# Patient Record
Sex: MALE | Race: BLACK OR AFRICAN AMERICAN | URBAN - METROPOLITAN AREA
[De-identification: names, ages, dates, MRNs, and addresses within clinical notes are randomized per-mention and may not be internally consistent; named-entity substitution may affect disease eponyms.]

---

## 2023-06-09 ENCOUNTER — OFFICE VISIT (OUTPATIENT)
Dept: INTERNAL MEDICINE CLINIC | Facility: CLINIC | Age: 43
End: 2023-06-09

## 2023-06-09 VITALS
DIASTOLIC BLOOD PRESSURE: 89 MMHG | SYSTOLIC BLOOD PRESSURE: 133 MMHG | WEIGHT: 205.81 LBS | BODY MASS INDEX: 27.87 KG/M2 | HEIGHT: 72 IN | HEART RATE: 58 BPM

## 2023-06-09 DIAGNOSIS — J02.9 SORE THROAT: Primary | ICD-10-CM

## 2023-06-09 DIAGNOSIS — L21.9 SEBORRHEIC DERMATITIS OF SCALP: ICD-10-CM

## 2023-06-09 LAB
CONTROL LINE PRESENT WITH A CLEAR BACKGROUND (YES/NO): YES YES/NO
KIT LOT #: NORMAL NUMERIC
STREP GRP A CUL-SCR: NEGATIVE

## 2023-06-09 PROCEDURE — 99203 OFFICE O/P NEW LOW 30 MIN: CPT | Performed by: INTERNAL MEDICINE

## 2023-06-09 PROCEDURE — 87880 STREP A ASSAY W/OPTIC: CPT | Performed by: INTERNAL MEDICINE

## 2023-06-09 PROCEDURE — 3075F SYST BP GE 130 - 139MM HG: CPT | Performed by: INTERNAL MEDICINE

## 2023-06-09 PROCEDURE — 3008F BODY MASS INDEX DOCD: CPT | Performed by: INTERNAL MEDICINE

## 2023-06-09 PROCEDURE — 3079F DIAST BP 80-89 MM HG: CPT | Performed by: INTERNAL MEDICINE

## 2023-06-09 NOTE — PATIENT INSTRUCTIONS
Please let me know if your scratchy throat persists. I would then refer you to ENT. Please schedule an appointment with Dermatology.

## 2023-06-16 ENCOUNTER — OFFICE VISIT (OUTPATIENT)
Dept: DERMATOLOGY CLINIC | Facility: CLINIC | Age: 43
End: 2023-06-16

## 2023-06-16 DIAGNOSIS — L21.9 SEBORRHEIC DERMATITIS: Primary | ICD-10-CM

## 2023-06-16 PROCEDURE — 99203 OFFICE O/P NEW LOW 30 MIN: CPT | Performed by: PHYSICIAN ASSISTANT

## 2023-06-16 RX ORDER — KETOCONAZOLE 20 MG/ML
SHAMPOO TOPICAL
Qty: 120 ML | Refills: 3 | Status: SHIPPED | OUTPATIENT
Start: 2023-06-16

## 2023-06-16 RX ORDER — FLUOCINOLONE ACETONIDE 0.11 MG/ML
1 OIL TOPICAL
Qty: 118.28 ML | Refills: 3 | Status: SHIPPED | OUTPATIENT
Start: 2023-06-19

## 2023-06-16 RX ORDER — KETOCONAZOLE 20 MG/G
CREAM TOPICAL
Qty: 30 G | Refills: 1 | Status: SHIPPED | OUTPATIENT
Start: 2023-06-16

## 2023-07-20 ENCOUNTER — OFFICE VISIT (OUTPATIENT)
Dept: INTERNAL MEDICINE CLINIC | Facility: CLINIC | Age: 43
End: 2023-07-20

## 2023-07-20 VITALS
BODY MASS INDEX: 27.9 KG/M2 | WEIGHT: 206 LBS | SYSTOLIC BLOOD PRESSURE: 134 MMHG | DIASTOLIC BLOOD PRESSURE: 86 MMHG | HEIGHT: 72 IN | OXYGEN SATURATION: 99 % | HEART RATE: 64 BPM

## 2023-07-20 DIAGNOSIS — M25.562 LEFT KNEE PAIN, UNSPECIFIED CHRONICITY: ICD-10-CM

## 2023-07-20 DIAGNOSIS — Z00.00 PE (PHYSICAL EXAM), ROUTINE: Primary | ICD-10-CM

## 2023-07-20 DIAGNOSIS — R13.19 OTHER DYSPHAGIA: ICD-10-CM

## 2023-07-20 DIAGNOSIS — R09.82 POST-NASAL DRAINAGE: ICD-10-CM

## 2023-07-20 DIAGNOSIS — D22.9 NUMEROUS SKIN MOLES: ICD-10-CM

## 2023-07-20 DIAGNOSIS — K21.9 GASTROESOPHAGEAL REFLUX DISEASE WITHOUT ESOPHAGITIS: ICD-10-CM

## 2023-07-20 PROCEDURE — 99396 PREV VISIT EST AGE 40-64: CPT | Performed by: INTERNAL MEDICINE

## 2023-07-20 PROCEDURE — 99213 OFFICE O/P EST LOW 20 MIN: CPT | Performed by: INTERNAL MEDICINE

## 2023-07-20 PROCEDURE — 3008F BODY MASS INDEX DOCD: CPT | Performed by: INTERNAL MEDICINE

## 2023-07-20 PROCEDURE — 3079F DIAST BP 80-89 MM HG: CPT | Performed by: INTERNAL MEDICINE

## 2023-07-20 PROCEDURE — 3075F SYST BP GE 130 - 139MM HG: CPT | Performed by: INTERNAL MEDICINE

## 2023-07-20 RX ORDER — PANTOPRAZOLE SODIUM 40 MG/1
40 TABLET, DELAYED RELEASE ORAL
Qty: 90 TABLET | Refills: 0 | Status: SHIPPED | OUTPATIENT
Start: 2023-07-20

## 2023-07-20 NOTE — PROGRESS NOTES
Subjective:   Patient ID: Salas Alvarez is a 37year old male. Patient presents with:  Physical  Knee Pain  Back Pain: C/o  on/off left sided back pain 1-2 weeks. Recalls no injuries. Stts that he can feel it in his chest as well. Patient presents today for physical exam, states doing well otherwise, has some left knee pain only with banding , occasional few seconds left back pain on off , he exercise and lift weights without any cp sob, mcgowan ,palpitations   also denies nausea, vomiting, diarrhea, constipation or abdominal pain. No fever, chills, or UTI symptoms. The rest of the review of systems, see below. Knee Pain   This is a recurrent problem. The current episode started more than 1 month ago. There has been no history of extremity trauma. The problem has been waxing and waning. The pain is mild. Associated symptoms include stiffness. Pertinent negatives include no fever or limited range of motion. Back Pain  This is a new problem. The current episode started in the past 7 days. The problem occurs rarely. The problem has been waxing and waning since onset. The pain is present in the thoracic spine. Pertinent negatives include no abdominal pain, bladder incontinence, bowel incontinence, chest pain, dysuria, fever, headaches or leg pain. He has tried nothing for the symptoms. The treatment provided significant relief. History/Other:   Review of Systems   Constitutional:  Negative for chills, fatigue and fever. HENT:  Negative for ear pain and sore throat. Eyes:  Negative for pain and redness. Respiratory:  Negative for cough, shortness of breath and wheezing. Cardiovascular:  Negative for chest pain, palpitations and leg swelling. Gastrointestinal:  Negative for abdominal pain, bowel incontinence, constipation, diarrhea, nausea and vomiting. Heartburn + swallowing problem for 10 years  for solid food    Genitourinary:  Negative for bladder incontinence, dysuria and frequency. Musculoskeletal:  Positive for arthralgias (left - few month -  only  with bending ,   not  walking), back pain (mid back pain 1-2  weeks , knee pain  - on his knees  instaling  tire) and stiffness. Skin:  Negative for pallor. Neurological:  Negative for dizziness and headaches. Psychiatric/Behavioral:  Negative for confusion. The patient is not nervous/anxious. Current Outpatient Medications   Medication Sig Dispense Refill    Fluocinolone Acetonide Scalp (DERMA-SMOOTHE/FS SCALP) 0.01 % External Oil Apply 1 Application topically twice a week. 118.28 mL 3    ketoconazole 2 % External Cream Apply to affected area 2 times daily. 30 g 1    ketoconazole 2 % External Shampoo Apply to scalp 2 times per week. 120 mL 3     Allergies:No Known Allergies    Objective:   Physical Exam  Vitals and nursing note reviewed. Constitutional:       General: He is not in acute distress. HENT:      Head: Normocephalic and atraumatic. Right Ear: Tympanic membrane, ear canal and external ear normal.      Left Ear: Tympanic membrane, ear canal and external ear normal.      Nose: Nose normal.      Mouth/Throat:      Pharynx: Uvula midline. No oropharyngeal exudate or posterior oropharyngeal erythema. Eyes:      General: No scleral icterus. Right eye: No discharge. Left eye: No discharge. Cardiovascular:      Rate and Rhythm: Normal rate and regular rhythm. Heart sounds: Normal heart sounds. No murmur heard. Pulmonary:      Effort: Pulmonary effort is normal. No respiratory distress. Breath sounds: Normal breath sounds. No wheezing or rales. Abdominal:      Palpations: Abdomen is soft. There is no mass. Tenderness: There is no abdominal tenderness. There is no right CVA tenderness or left CVA tenderness. Comments: No hepatomegaly, no splenomegaly   Musculoskeletal:      Cervical back: Neck supple. Right lower leg: No edema. Left lower leg: No edema.    Lymphadenopathy: Cervical: No cervical adenopathy. Skin:     General: Skin is warm and dry. Comments: Skin moles numerous   Neurological:      Mental Status: He is alert and oriented to person, place, and time. Psychiatric:         Behavior: Behavior normal.     Blood pressure 134/86, pulse 64, height 6' (1.829 m), weight 206 lb (93.4 kg), SpO2 99 %. Assessment & Plan:   No diagnosis found. Physical exam  Maintain a healthy diet , low saturated fat and low sugar diet  Keep good hydration  Maintain a regular activity /walking as tolerated   Complete labs as ordered,   Denies covid and prostate  fhx cancer   Preventative health maintenance tests reviewed   Immunizations reviewed - [facility]  covid vaccine x2 2021 ,   tdap -  2021   Patient verbalized understanding and compliance    - CBC WITH DIFFERENTIAL WITH PLATELET; Future  - COMP METABOLIC PANEL (14); Future  - LIPID PANEL; Future  - URINE CULTURE, ROUTINE; Future  - TSH W REFLEX TO FREE T4; Future    2. Numerous skin moles  - refer to DERM - INTERNAL    3. Left knee pain, unspecified chronicity  Avoid kneeling   Refer to PT if perisitnt pain  - XR KNEE (3 VIEWS), LEFT (CPT=73562); Future    4. Gastroesophageal reflux disease without esophagitis  Patient advised to avoid spicy food, coffee, tea, caffeinated drinks, alcohol, acidic food/juices  Advised to avoid NSAID's - Aspirin-based medications  Advised to avoid wearing  tight clothes   Advised to elevate the head of the bed   Avoid eating at least 3 hours before bedtime   Counseling on ideal weight/BMI  Take PPIs qd in the morning 30-60 minutes before breakfast always on empty stomach Side effects and directions of medication discussed with patient. Patient verbalized understanding and compliance. 5. Post-nasal drainage  Allegra 1800 mg every day 2-3 weeks than as needed     6.  Other dysphagia  Soft food   Liquidly food  Refer to Gastro   PPI every day  to start   - GASTRO - INTERNAL     No orders of the defined types were placed in this encounter.       Meds This Visit:  Requested Prescriptions      No prescriptions requested or ordered in this encounter       Imaging & Referrals:  None

## 2023-07-21 RX ORDER — FEXOFENADINE HCL 180 MG/1
180 TABLET ORAL DAILY
Qty: 30 TABLET | Refills: 1 | Status: SHIPPED | OUTPATIENT
Start: 2023-07-21

## 2023-07-26 RX ORDER — FLUOCINOLONE ACETONIDE 0.11 MG/ML
1 OIL TOPICAL
Qty: 118.28 ML | Refills: 3 | OUTPATIENT
Start: 2023-07-27

## 2023-07-26 NOTE — TELEPHONE ENCOUNTER
Current refill request refused due to refill is either a duplicate request or has active refills at the pharmacy. Check previous templates. Requested Prescriptions     Refused Prescriptions Disp Refills    Fluocinolone Acetonide Scalp (DERMA-SMOOTHE/FS SCALP) 0.01 % External Oil 118.28 mL 3     Sig: Apply 1 Application topically twice a week.      Refused By: Patience Pale     Reason for Refusal: Request already responded to by other means (e.g. phone or fax)

## 2023-07-27 ENCOUNTER — HOSPITAL ENCOUNTER (OUTPATIENT)
Dept: GENERAL RADIOLOGY | Facility: HOSPITAL | Age: 43
Discharge: HOME | End: 2023-07-27
Attending: INTERNAL MEDICINE
Payer: COMMERCIAL

## 2023-07-27 DIAGNOSIS — M25.562 LEFT KNEE PAIN, UNSPECIFIED CHRONICITY: ICD-10-CM

## 2023-07-27 PROCEDURE — 73562 X-RAY EXAM OF KNEE 3: CPT | Performed by: INTERNAL MEDICINE

## 2023-07-29 DIAGNOSIS — G89.29 CHRONIC LOW BACK PAIN, UNSPECIFIED BACK PAIN LATERALITY, UNSPECIFIED WHETHER SCIATICA PRESENT: ICD-10-CM

## 2023-07-29 DIAGNOSIS — M25.562 LEFT KNEE PAIN, UNSPECIFIED CHRONICITY: Primary | ICD-10-CM

## 2023-07-29 DIAGNOSIS — M54.50 CHRONIC LOW BACK PAIN, UNSPECIFIED BACK PAIN LATERALITY, UNSPECIFIED WHETHER SCIATICA PRESENT: ICD-10-CM

## 2023-10-22 RX ORDER — PANTOPRAZOLE SODIUM 40 MG/1
40 TABLET, DELAYED RELEASE ORAL
Qty: 90 TABLET | Refills: 3 | Status: SHIPPED | OUTPATIENT
Start: 2023-10-22

## 2023-10-22 NOTE — TELEPHONE ENCOUNTER
Refill passed per [facility] protocol.    Requested Prescriptions   Pending Prescriptions Disp Refills    PANTOPRAZOLE 40 MG Oral Tab EC [Pharmacy Med Name: PANTOPRAZOLE 40MG TABLETS] 90 tablet 0     Sig: TAKE 1 TABLET BY MOUTH EVERY MORNING BEFORE BREAKFAST       Gastrointestional Medication Protocol Passed - 10/21/2023  6:31 PM        Passed - In person appointment or virtual visit in the past 12 mos or appointment in next 3 mos     Recent Outpatient Visits              3 months ago PE (physical exam), routine    [facility]    Office Visit    4 months ago [facility]    4 [facility]    [facility]    7 years ago L5-S1 mild central HNP    [facility] Jere Velez MD    Office Visit    7 years ago left L5-S1 radiculopathy    [facility] Jere Velez MD    Office Visit                               Recent Outpatient Visits              3 months ago PE (physical exam), routine    [facility]    Office Visit    4 months ago [facility]    [facility]    4 months [facility]    [facility]    7 years ago L5-S1 mild central HNP    [facility] Jere Velez MD    Office Visit    7 years ago left L5-S1 radiculopathy    [facility] Jere Velez MD    Office Visit

## 2023-12-22 RX ORDER — FLUOCINOLONE ACETONIDE 0.11 MG/ML
1 OIL TOPICAL
Qty: 118.28 ML | Refills: 3 | Status: SHIPPED | OUTPATIENT
Start: 2023-12-25

## 2023-12-22 RX ORDER — KETOCONAZOLE 20 MG/G
CREAM TOPICAL
Qty: 30 G | Refills: 1 | Status: SHIPPED | OUTPATIENT
Start: 2023-12-22

## 2023-12-22 RX ORDER — KETOCONAZOLE 20 MG/ML
SHAMPOO TOPICAL
Qty: 120 ML | Refills: 3 | Status: SHIPPED | OUTPATIENT
Start: 2023-12-22

## 2023-12-22 NOTE — TELEPHONE ENCOUNTER
Refill Request for medication(s):   Fluocinolone Acetonide Scalp  Ketoconazole 2% Cream  Ketoconazole 2% Shampoo    Last Office Visit: 06/16/23    Last Refill:   Fluocinolone Acetonide Scalp- 06/19/23  Ketoconazole 2% Cream- 06/16/23  Ketoconazole 2% Shampoo- 06/16/23    Pharmacy, Dosage verified: yes    Condition Update (if applicable): MyChart msg sent to pt for update. Rx pended and sent to provider for approval, please advise. Thank You!

## 2024-11-09 RX ORDER — FLUOCINOLONE ACETONIDE 0.11 MG/ML
1 OIL TOPICAL
Qty: 118.28 ML | Refills: 3 | Status: CANCELLED | OUTPATIENT
Start: 2024-11-11

## 2024-11-11 NOTE — TELEPHONE ENCOUNTER
Refill Request for medication(s):     Last Office Visit: 06/16/2023    Last Refill:     Pharmacy, Dosage verified:     Condition Update (if applicable):     Rx pended and sent to provider for approval, please advise. Thank You!

## 2024-11-14 NOTE — TELEPHONE ENCOUNTER
Refill Request for medication(s): Fluocinolone Acetonide Scalp (DERMA-SMOOTHE/FS SCALP) 0.01 % External Oil     Last Office Visit: 06/16/23    Last Refill: 12/25/23    Pharmacy, Dosage verified: [id number] [address] AT [facility], [phone], [phone]     Condition Update (if applicable): I was told by the insurance that I didn't need another referral to get prescription filled so I am putting in another request as I am totally out of the scalp oil.     Rx pended and sent to provider for approval, please advise. Thank You!

## 2024-11-15 RX ORDER — FLUOCINOLONE ACETONIDE 0.11 MG/ML
1 OIL TOPICAL
Qty: 118.28 ML | Refills: 3 | OUTPATIENT
Start: 2024-11-18

## 2024-12-23 ENCOUNTER — TELEPHONE (OUTPATIENT)
Dept: INTERNAL MEDICINE CLINIC | Facility: CLINIC | Age: 44
End: 2024-12-23

## 2024-12-23 PROBLEM — R10.9 FLANK PAIN: Status: ACTIVE | Noted: 2021-10-21

## 2024-12-23 NOTE — TELEPHONE ENCOUNTER
Patient scheduled a mychart appointment noting the following symptoms on appointment notes    back pain, chest pain, vision and hearing issues.     Left message to call back and mychart message sent

## 2024-12-23 NOTE — TELEPHONE ENCOUNTER
Of note, patient has a separate appointment today.  Attempted to reach patient.  Left message to call back.  Fareyehart message previously sent.

## 2024-12-24 NOTE — TELEPHONE ENCOUNTER
Left Voicemail to call back our office. Office phone number provided with office telephone hours.          Appt from 12/23 was canceled     Future Appointments   Date Time Provider Department Center   1/30/2025  1:40 PM Amparo Tay MD [facility]

## (undated) NOTE — LETTER
12/26/2024    Michelle Sebastian  [address] [facility] [address]         Dear Michelle,    This letter is to inform you that our office has made several attempts to reach you by phone without success.  We were attempting to contact you by phone regarding your symptoms.     Please contact our office at the number listed below as soon as you receive this letter to discuss this issue and to make the necessary changes in our system to your contact information.  Thank you for your cooperation.        Sincerely,    Amparo Tay MD  [address] [place] [address]  Ph: [phone]  Fax: [phone]         Document electronically generated by:  Celi MATTHEW RN